# Patient Record
Sex: FEMALE | Race: WHITE | NOT HISPANIC OR LATINO | Employment: OTHER | URBAN - METROPOLITAN AREA
[De-identification: names, ages, dates, MRNs, and addresses within clinical notes are randomized per-mention and may not be internally consistent; named-entity substitution may affect disease eponyms.]

---

## 2023-04-12 DIAGNOSIS — I34.0 SEVERE MITRAL REGURGITATION: Primary | ICD-10-CM

## 2023-04-12 RX ORDER — LEVALBUTEROL INHALATION SOLUTION 0.63 MG/3ML
0.63 SOLUTION RESPIRATORY (INHALATION)
COMMUNITY
Start: 2023-03-28

## 2023-04-12 RX ORDER — FUROSEMIDE 40 MG/1
40 TABLET ORAL 2 TIMES DAILY
COMMUNITY
Start: 2023-03-28

## 2023-04-12 RX ORDER — METOPROLOL SUCCINATE 25 MG/1
1 TABLET, EXTENDED RELEASE ORAL DAILY
COMMUNITY
Start: 2022-08-03

## 2023-04-12 RX ORDER — METOLAZONE 5 MG/1
TABLET ORAL
COMMUNITY
Start: 2023-04-10

## 2023-04-12 RX ORDER — ISOSORBIDE MONONITRATE 30 MG/1
1 TABLET, EXTENDED RELEASE ORAL 2 TIMES DAILY
COMMUNITY
Start: 2022-08-03

## 2023-04-12 RX ORDER — PRIMIDONE 50 MG/1
25 TABLET ORAL
COMMUNITY
Start: 2022-06-29

## 2023-04-12 RX ORDER — ASPIRIN 81 MG/1
81 TABLET ORAL
COMMUNITY

## 2023-04-12 RX ORDER — MELOXICAM 15 MG/1
1 TABLET ORAL DAILY
COMMUNITY
Start: 2023-04-07

## 2023-04-12 RX ORDER — PRAVASTATIN SODIUM 40 MG/1
1 TABLET ORAL NIGHTLY
COMMUNITY
Start: 2022-08-03 | End: 2023-08-03

## 2023-04-12 RX ORDER — OXYCODONE AND ACETAMINOPHEN 10; 325 MG/1; MG/1
1 TABLET ORAL 2 TIMES DAILY PRN
COMMUNITY

## 2023-04-12 RX ORDER — ESCITALOPRAM OXALATE 10 MG/1
1 TABLET ORAL DAILY
COMMUNITY
Start: 2023-04-07

## 2023-04-14 ENCOUNTER — TELEPHONE (OUTPATIENT)
Dept: CARDIOTHORACIC SURGERY | Facility: CLINIC | Age: 73
End: 2023-04-14

## 2023-04-14 NOTE — TELEPHONE ENCOUNTER
Contacted pt to schedule consult appt with Dr. Mcdonnell. Pt is currently in Rehab and is unsure of her discharge date. Provided pt with clinic's contact information. Pt will call to schedule once discharged

## 2023-04-17 ENCOUNTER — TELEPHONE (OUTPATIENT)
Dept: CARDIOLOGY | Facility: CLINIC | Age: 73
End: 2023-04-17

## 2023-04-17 NOTE — TELEPHONE ENCOUNTER
IC staff contacted patient to schedule for structural valve referral from Dr. Alarcon. Patient stated that she is currently in rehab, and does not know her discharge date. IC staff gave patient contact information for our office, and reiterated to patient to call us once she is discharged. Patient verbalized understanding.

## 2023-05-09 ENCOUNTER — TELEPHONE (OUTPATIENT)
Dept: CARDIOTHORACIC SURGERY | Facility: CLINIC | Age: 73
End: 2023-05-09

## 2023-05-09 NOTE — TELEPHONE ENCOUNTER
Attempted to reach pt to follow up and schedule consult. Last time clinic spoke with pt she stated she was admitted into a rehab facility. Call was unanswered.Left VM for pt to call clinic. Pt would have to be scheduled prior to July 12 or she would have to have a repeat TTE.

## 2023-06-08 ENCOUNTER — TELEPHONE (OUTPATIENT)
Dept: CARDIOTHORACIC SURGERY | Facility: CLINIC | Age: 73
End: 2023-06-08
Payer: MEDICARE

## 2023-06-08 NOTE — TELEPHONE ENCOUNTER
Contacted pt to schedule with Dr. Wood. Pt scheduled on 6.22.23 for 3:15. Provided pt with date, time and location of appt. . Instructed pt to bring insurance card and picture ID to appt . Pt verbalized understanding. Appt letter mailed

## 2023-06-21 ENCOUNTER — TELEPHONE (OUTPATIENT)
Dept: CARDIOTHORACIC SURGERY | Facility: CLINIC | Age: 73
End: 2023-06-21
Payer: MEDICARE

## 2023-06-21 NOTE — TELEPHONE ENCOUNTER
Confirmed appt with pt. Provided pt with time and location of consult with Dr. Wood. Pt verbalized understanding

## 2023-06-22 ENCOUNTER — OFFICE VISIT (OUTPATIENT)
Dept: CARDIOTHORACIC SURGERY | Facility: CLINIC | Age: 73
End: 2023-06-22
Payer: MEDICARE

## 2023-06-22 VITALS
WEIGHT: 130 LBS | SYSTOLIC BLOOD PRESSURE: 121 MMHG | HEIGHT: 64 IN | BODY MASS INDEX: 22.2 KG/M2 | OXYGEN SATURATION: 98 % | RESPIRATION RATE: 18 BRPM | DIASTOLIC BLOOD PRESSURE: 56 MMHG | HEART RATE: 87 BPM

## 2023-06-22 DIAGNOSIS — I34.0 NONRHEUMATIC MITRAL VALVE REGURGITATION: ICD-10-CM

## 2023-06-22 DIAGNOSIS — I34.0 SEVERE MITRAL REGURGITATION: ICD-10-CM

## 2023-06-22 PROCEDURE — 3078F PR MOST RECENT DIASTOLIC BLOOD PRESSURE < 80 MM HG: ICD-10-PCS | Mod: CPTII,S$GLB,, | Performed by: THORACIC SURGERY (CARDIOTHORACIC VASCULAR SURGERY)

## 2023-06-22 PROCEDURE — 99204 OFFICE O/P NEW MOD 45 MIN: CPT | Mod: S$GLB,,, | Performed by: THORACIC SURGERY (CARDIOTHORACIC VASCULAR SURGERY)

## 2023-06-22 PROCEDURE — 3074F PR MOST RECENT SYSTOLIC BLOOD PRESSURE < 130 MM HG: ICD-10-PCS | Mod: CPTII,S$GLB,, | Performed by: THORACIC SURGERY (CARDIOTHORACIC VASCULAR SURGERY)

## 2023-06-22 PROCEDURE — 99999 PR PBB SHADOW E&M-EST. PATIENT-LVL IV: ICD-10-PCS | Mod: PBBFAC,,, | Performed by: THORACIC SURGERY (CARDIOTHORACIC VASCULAR SURGERY)

## 2023-06-22 PROCEDURE — 3008F PR BODY MASS INDEX (BMI) DOCUMENTED: ICD-10-PCS | Mod: CPTII,S$GLB,, | Performed by: THORACIC SURGERY (CARDIOTHORACIC VASCULAR SURGERY)

## 2023-06-22 PROCEDURE — 1159F MED LIST DOCD IN RCRD: CPT | Mod: CPTII,S$GLB,, | Performed by: THORACIC SURGERY (CARDIOTHORACIC VASCULAR SURGERY)

## 2023-06-22 PROCEDURE — 3078F DIAST BP <80 MM HG: CPT | Mod: CPTII,S$GLB,, | Performed by: THORACIC SURGERY (CARDIOTHORACIC VASCULAR SURGERY)

## 2023-06-22 PROCEDURE — 1126F AMNT PAIN NOTED NONE PRSNT: CPT | Mod: CPTII,S$GLB,, | Performed by: THORACIC SURGERY (CARDIOTHORACIC VASCULAR SURGERY)

## 2023-06-22 PROCEDURE — 1159F PR MEDICATION LIST DOCUMENTED IN MEDICAL RECORD: ICD-10-PCS | Mod: CPTII,S$GLB,, | Performed by: THORACIC SURGERY (CARDIOTHORACIC VASCULAR SURGERY)

## 2023-06-22 PROCEDURE — 1126F PR PAIN SEVERITY QUANTIFIED, NO PAIN PRESENT: ICD-10-PCS | Mod: CPTII,S$GLB,, | Performed by: THORACIC SURGERY (CARDIOTHORACIC VASCULAR SURGERY)

## 2023-06-22 PROCEDURE — 3074F SYST BP LT 130 MM HG: CPT | Mod: CPTII,S$GLB,, | Performed by: THORACIC SURGERY (CARDIOTHORACIC VASCULAR SURGERY)

## 2023-06-22 PROCEDURE — 99204 PR OFFICE/OUTPT VISIT, NEW, LEVL IV, 45-59 MIN: ICD-10-PCS | Mod: S$GLB,,, | Performed by: THORACIC SURGERY (CARDIOTHORACIC VASCULAR SURGERY)

## 2023-06-22 PROCEDURE — 99999 PR PBB SHADOW E&M-EST. PATIENT-LVL IV: CPT | Mod: PBBFAC,,, | Performed by: THORACIC SURGERY (CARDIOTHORACIC VASCULAR SURGERY)

## 2023-06-22 PROCEDURE — 3008F BODY MASS INDEX DOCD: CPT | Mod: CPTII,S$GLB,, | Performed by: THORACIC SURGERY (CARDIOTHORACIC VASCULAR SURGERY)

## 2023-06-22 RX ORDER — DULOXETIN HYDROCHLORIDE 20 MG/1
20 CAPSULE, DELAYED RELEASE ORAL
COMMUNITY
Start: 2023-04-07

## 2023-06-22 RX ORDER — FENOFIBRATE 160 MG/1
160 TABLET ORAL
COMMUNITY
Start: 2023-06-01 | End: 2024-05-31

## 2023-06-22 RX ORDER — DIVALPROEX SODIUM 500 MG/1
1000 TABLET, FILM COATED, EXTENDED RELEASE ORAL
COMMUNITY
Start: 2023-03-18

## 2023-06-22 RX ORDER — POTASSIUM CHLORIDE 750 MG/1
10 TABLET, EXTENDED RELEASE ORAL
COMMUNITY
Start: 2023-05-08

## 2023-06-22 NOTE — PROGRESS NOTES
Subjective:      Patient ID: Dania Jenkins is a 72 y.o. female.    Chief Complaint: No chief complaint on file.      HPI:  Dania Jenkins is a 72 y.o. female who presents to an initial surgical evaluation for mitral insufficiency. Referred by Dr. Emmanuel. Medical conditions include CAD s/p CABG at [facility] in 2000, chronic diastolic HF, HTN, hypercholesterolemia. Severe MR evaluated by Dr. Siddiqui who recommended conservative therapy 2/2 significant mitral annular calcifications and concern that the posterior leaflet may be too short for mitraclip. Last LHC was June 2015 noting a patent LIMA to the % occluded SVG to the RCA.  The RCA did have moderate disease in the proximal portion and patent posterior descending artery.  She had an abnormal Myoview study in June 2018 known apical lateral infarct with mild jessica-infarct ischemia treated medically, EF was 66%. G[facility]ERT in November 2019 noted severe mitral regurgitation with reversal flow in the pulmonary veins, moderate TR and moderate LV dysfunction.  Carotid duplex in October 2021 noted 50 to 69% stenosis of bilateral internal carotid arteries.     Patient was hospitalized at [facility] in March 2023 with altered mental status acute respiratory failure left lower lobe pneumonia with acute diastolic CHF she was found to have severe worsening of the pulmonary HTN MR and TR.  She was diuresed and discharged home.    Patient presents today using rolling walker, used for stability due to arthritic pain and scoliosis. She endorse fatigue and bilateral LE swelling. She denies CP, SOB, orthopnea, palpitations. Denies prior history of stroke, blood clots, seizures. She had 1 sternotomy. Able to perform ADL with assistance of walker. Smoke 5 cig/day for 53 years. Denies ETOH use.       .    Family and social history reviewed    Review of patient's allergies indicates:   Allergen Reactions    Penicillins Swelling    Ace inhibitors      Other  reaction(s): Cough     No past medical history on file.  No past surgical history on file.  Family History    None       Social History     Socioeconomic History    Marital status: Significant Other       Current medications Reviewed  Current Outpatient Medications on File Prior to Visit   Medication Sig Dispense Refill    aspirin (ECOTRIN) 81 MG EC tablet Take 81 mg by mouth.      divalproex 500 MG Tb24 Take 1,000 mg by mouth.      DULoxetine (CYMBALTA) 20 MG capsule Take 20 mg by mouth.      empagliflozin (JARDIANCE) 10 mg tablet Take 10 mg by mouth.      EScitalopram oxalate (LEXAPRO) 10 MG tablet Take 1 tablet by mouth once daily.      fenofibrate 160 MG Tab Take 160 mg by mouth.      furosemide (LASIX) 40 MG tablet Take 40 mg by mouth 2 (two) times daily.      isosorbide mononitrate (IMDUR) 30 MG 24 hr tablet Take 1 tablet by mouth 2 (two) times daily.      levalbuterol (XOPENEX) 0.63 mg/3 mL nebulizer solution 0.63 mg.      meloxicam (MOBIC) 15 MG tablet Take 1 tablet by mouth once daily.      metOLazone (ZAROXOLYN) 5 MG tablet Take 1 tablet 30 minutes before taking lasix      metoprolol succinate (TOPROL-XL) 25 MG 24 hr tablet Take 1 tablet by mouth once daily.      oxyCODONE-acetaminophen (PERCOCET)  mg per tablet Take 1 tablet by mouth 2 (two) times daily as needed.      potassium chloride (KLOR-CON) 10 MEQ TbSR Take 10 mEq by mouth.      pravastatin (PRAVACHOL) 40 MG tablet Take 1 tablet by mouth every evening.      primidone (MYSOLINE) 50 MG Tab Take 25 mg by mouth.       No current facility-administered medications on file prior to visit.       Review of Systems   Constitutional:  Positive for fatigue. Negative for activity change, appetite change and fever.   HENT:  Negative for nosebleeds.    Respiratory:  Negative for cough and shortness of breath.    Cardiovascular:  Positive for leg swelling. Negative for chest pain and palpitations.   Gastrointestinal:  Negative for abdominal distention,  abdominal pain and nausea.   Genitourinary:  Negative for frequency.   Musculoskeletal:  Negative for arthralgias and myalgias.   Skin:  Negative for rash.   Neurological:  Positive for light-headedness. Negative for dizziness, seizures and numbness.   Hematological:  Does not bruise/bleed easily.   Objective:   Physical Exam  Constitutional:       Appearance: Normal appearance.   HENT:      Head: Normocephalic and atraumatic.      Nose: Nose normal.      Mouth/Throat:      Mouth: Mucous membranes are moist.   Eyes:      Extraocular Movements: Extraocular movements intact.      Pupils: Pupils are equal, round, and reactive to light.   Cardiovascular:      Rate and Rhythm: Normal rate.      Heart sounds: Murmur heard.   Pulmonary:      Effort: Pulmonary effort is normal.   Abdominal:      General: Abdomen is flat.      Palpations: Abdomen is soft.   Musculoskeletal:         General: Normal range of motion.      Cervical back: Normal range of motion.      Right lower leg: Edema present.      Left lower leg: Edema present.   Skin:     General: Skin is warm and dry.      Capillary Refill: Capillary refill takes less than 2 seconds.      Coloration: Skin is not pale.   Neurological:      General: No focal deficit present.      Mental Status: She is alert.      Gait: Gait abnormal.       Diagnostic Results: reviewed   ECHO 3/2023  EF 70%  Mild AS   Moderate MAC; severe MR.   Severe TR, severe pulmonary htn     Carotid US 14/2022  1.  50-69% bilateral internal carotid artery with extensive calcified plaques   2. Compared to previous study done in October 2021 no significant change is noted.   3.  In this patient with the above findings, would recommend antiplatelet therapy and a repeat study in the next 6 to 12   months to reevaluate for progressive disease.   Assessment:   Severe mitral insufficiency   Plan:     CTS Attending Note:    I have personally taken the history and examined this patient and agree with the JAMES's  note as stated above.  Very pleasant 72-year-old woman with severe mitral regurgitation.  Due to her comorbid conditions she is inoperable.  Recommend medical management with or without consideration of mitral clip.

## 2023-06-25 PROBLEM — I34.0 NONRHEUMATIC MITRAL VALVE REGURGITATION: Status: ACTIVE | Noted: 2023-06-25
